# Patient Record
Sex: MALE
[De-identification: names, ages, dates, MRNs, and addresses within clinical notes are randomized per-mention and may not be internally consistent; named-entity substitution may affect disease eponyms.]

---

## 2018-11-13 ENCOUNTER — HOSPITAL ENCOUNTER (EMERGENCY)
Dept: HOSPITAL 42 - ED | Age: 53
Discharge: HOME | End: 2018-11-13
Payer: COMMERCIAL

## 2018-11-13 VITALS — HEART RATE: 69 BPM | DIASTOLIC BLOOD PRESSURE: 71 MMHG | SYSTOLIC BLOOD PRESSURE: 135 MMHG | OXYGEN SATURATION: 96 %

## 2018-11-13 VITALS — TEMPERATURE: 97.9 F

## 2018-11-13 VITALS — RESPIRATION RATE: 16 BRPM

## 2018-11-13 VITALS — BODY MASS INDEX: 33.9 KG/M2

## 2018-11-13 DIAGNOSIS — M25.561: Primary | ICD-10-CM

## 2018-11-13 NOTE — ED PDOC
Arrival/HPI





- General


Chief Complaint: Lower Extremity Problem/Injury


Historian: Patient





- History of Present Illness


Time/Duration: Other (approximately 6 weeks)


Symptom Onset: Gradual


Symptom Course: Worsening


Quality: Aching


Severity Level: Moderate


Associated Symptoms (Text): 





11/13/18 10:49


Patient complains of approximately a 6 week history of right medial knee pain. 

No injury or trauma. No hip or ankle pain. He was seen by his PMD approximately 

2 weeks ago and given a prescription for Naprosyn, but he is no better. He 

states that he had trouble sleeping last night and decided to come to the 

emergency department. He works as a doorman. He has limited range of motion. He 

is tender along the medial joint line. There is no swelling or effusion. No skin

changes. He has had no imaging done.





Family/Social History





- Physician Review


Nursing Documentation Reviewed: Yes


Family/Social History: Unknown Family HX


Smoking Status: Current Some Days Smoker


Hx Alcohol Use: Yes


Frequency of alcohol use: Socially


Hx Substance Use: No





Allergies/Home Meds


Allergies/Adverse Reactions: 


Allergies





No Known Allergies Allergy (Verified 11/13/18 10:45)


   








Home Medications: 


                                    Home Meds











 Medication  Instructions  Recorded  Confirmed


 


Naproxen [Naprosyn] 500 mg PO BID PRN 11/13/18 11/13/18














Review of Systems





- Physician Review


All systems were reviewed & negative as marked: Yes





- Review of Systems


Constitutional: Normal


Musculoskeletal: absent: Arthralgias, Joint Swelling, Myalgias


Skin: Normal





Physical Exam


Temperature: Afebrile


Blood Pressure: Hypertensive


Pulse: Regular


Respiratory Rate: Normal


Appearance: Positive for: Well-Appearing, Non-Toxic, Uncomfortable


Pain Distress: Moderate


Mental Status: Positive for: Alert and Oriented X 3





- Systems Exam


Lower Extremity: Present: Normal Inspection, NORMAL PULSES, Tenderness, 

Neurovascularly Intact, Other (Right knee medial joint line tenderness. No 

swelling or skin changes. Limited range of motion secondary to pain. No calf 

tenderness. The ankle and hip are normal.).  No: Edema, CALF TENDERNESS, 

Cyanosis, Normal ROM, Demetra's Sign, Swelling, Erythema, Deformity, Temperature 

Abnormalties


Skin: Present: Warm, Dry, Normal Color.  No: Rashes





Medical Decision Making


ED Course and Treatment: 





11/13/18 10:52


Suspect medial meniscus tear and patient will need MRI as an outpatient with his

 PMD.





- RAD Interpretation


Radiology Orders: 











11/13/18 10:48


KNEE RIGHT 2 VIEWS (AP & LAT) [RAD] Stat 








Right knee shows no fracture dislocation or effusion.


: ED Physician





Disposition/Present on Arrival





- Present on Arrival


Any Indicators Present on Arrival: No


History of DVT/PE: No


History of Uncontrolled Diabetes: No


Urinary Catheter: No


History of Decub. Ulcer: No





- Disposition


Have Diagnosis and Disposition been Completed?: Yes


Diagnosis: 


 Right knee pain





Disposition: HOME/ ROUTINE


Disposition Time: 11:28


Patient Plan: Discharge


Condition: GOOD


Discharge Instructions (ExitCare):  Knee Pain (DC)


Additional Instructions: 


Follow-up with PMD. Follow up in ER as needed. Suggest outpatient MRI to rule 

out meniscus tear.


Prescriptions: 


Tramadol HCl [Ultram] 50 mg PO Q6 PRN #15 tab


 PRN Reason: Pain


Forms:  [facility] (English), WORK NOTE

## 2018-11-13 NOTE — RAD
Date of service: 



11/13/2018



PROCEDURE:  Right Knee Radiographs.



HISTORY:

pain



COMPARISON:

None.



FINDINGS:



BONES:

Normal. No fracture. 



JOINTS:

Normal. No osteoarthritis. 



JOINT EFFUSION:

None. 



OTHER FINDINGS:

None.



IMPRESSION:

Normal radiographs of the right knee.